# Patient Record
Sex: FEMALE | Race: WHITE | NOT HISPANIC OR LATINO | Employment: FULL TIME | URBAN - METROPOLITAN AREA
[De-identification: names, ages, dates, MRNs, and addresses within clinical notes are randomized per-mention and may not be internally consistent; named-entity substitution may affect disease eponyms.]

---

## 2017-12-13 ENCOUNTER — APPOINTMENT (OUTPATIENT)
Dept: WOMENS IMAGING | Facility: HOSPITAL | Age: 47
End: 2017-12-13

## 2017-12-13 PROCEDURE — 77063 BREAST TOMOSYNTHESIS BI: CPT | Performed by: RADIOLOGY

## 2017-12-13 PROCEDURE — G0202 SCR MAMMO BI INCL CAD: HCPCS | Performed by: RADIOLOGY

## 2017-12-13 PROCEDURE — 77067 SCR MAMMO BI INCL CAD: CPT | Performed by: RADIOLOGY

## 2017-12-13 PROCEDURE — MDREVIEWSP: Performed by: RADIOLOGY

## 2018-12-14 ENCOUNTER — APPOINTMENT (OUTPATIENT)
Dept: WOMENS IMAGING | Facility: HOSPITAL | Age: 48
End: 2018-12-14

## 2018-12-14 PROCEDURE — MDREVIEWSP: Performed by: RADIOLOGY

## 2018-12-14 PROCEDURE — 77063 BREAST TOMOSYNTHESIS BI: CPT | Performed by: RADIOLOGY

## 2018-12-14 PROCEDURE — 77067 SCR MAMMO BI INCL CAD: CPT | Performed by: RADIOLOGY

## 2019-12-16 ENCOUNTER — APPOINTMENT (OUTPATIENT)
Dept: WOMENS IMAGING | Facility: HOSPITAL | Age: 49
End: 2019-12-16

## 2019-12-16 PROCEDURE — MDREVIEWSP: Performed by: RADIOLOGY

## 2019-12-16 PROCEDURE — 77063 BREAST TOMOSYNTHESIS BI: CPT | Performed by: RADIOLOGY

## 2019-12-16 PROCEDURE — 77067 SCR MAMMO BI INCL CAD: CPT | Performed by: RADIOLOGY

## 2020-07-13 ENCOUNTER — PREP FOR SURGERY (OUTPATIENT)
Dept: OTHER | Facility: HOSPITAL | Age: 50
End: 2020-07-13

## 2020-07-13 DIAGNOSIS — Z12.11 SCREENING FOR COLON CANCER: Primary | ICD-10-CM

## 2020-09-02 ENCOUNTER — TRANSCRIBE ORDERS (OUTPATIENT)
Dept: SLEEP MEDICINE | Facility: HOSPITAL | Age: 50
End: 2020-09-02

## 2020-09-02 DIAGNOSIS — Z01.818 OTHER SPECIFIED PRE-OPERATIVE EXAMINATION: Primary | ICD-10-CM

## 2020-09-07 ENCOUNTER — LAB (OUTPATIENT)
Dept: LAB | Facility: HOSPITAL | Age: 50
End: 2020-09-07

## 2020-09-07 DIAGNOSIS — Z01.818 OTHER SPECIFIED PRE-OPERATIVE EXAMINATION: ICD-10-CM

## 2020-09-07 PROCEDURE — U0004 COV-19 TEST NON-CDC HGH THRU: HCPCS

## 2020-09-07 PROCEDURE — C9803 HOPD COVID-19 SPEC COLLECT: HCPCS

## 2020-09-08 LAB — SARS-COV-2 RNA RESP QL NAA+PROBE: NOT DETECTED

## 2020-09-09 ENCOUNTER — HOSPITAL ENCOUNTER (OUTPATIENT)
Facility: HOSPITAL | Age: 50
Setting detail: HOSPITAL OUTPATIENT SURGERY
Discharge: HOME | End: 2020-09-09
Attending: COLON & RECTAL SURGERY | Admitting: COLON & RECTAL SURGERY

## 2020-09-09 ENCOUNTER — ANESTHESIA EVENT (OUTPATIENT)
Dept: GASTROENTEROLOGY | Facility: HOSPITAL | Age: 50
End: 2020-09-09

## 2020-09-09 ENCOUNTER — ANESTHESIA (OUTPATIENT)
Dept: GASTROENTEROLOGY | Facility: HOSPITAL | Age: 50
End: 2020-09-09

## 2020-09-09 VITALS
OXYGEN SATURATION: 99 % | BODY MASS INDEX: 23.66 KG/M2 | HEART RATE: 68 BPM | SYSTOLIC BLOOD PRESSURE: 98 MMHG | RESPIRATION RATE: 16 BRPM | DIASTOLIC BLOOD PRESSURE: 52 MMHG | HEIGHT: 65 IN | WEIGHT: 142 LBS

## 2020-09-09 PROCEDURE — 45378 DIAGNOSTIC COLONOSCOPY: CPT | Performed by: COLON & RECTAL SURGERY

## 2020-09-09 PROCEDURE — 25010000002 PROPOFOL 10 MG/ML EMULSION: Performed by: NURSE ANESTHETIST, CERTIFIED REGISTERED

## 2020-09-09 RX ORDER — SODIUM CHLORIDE 0.9 % (FLUSH) 0.9 %
3 SYRINGE (ML) INJECTION EVERY 12 HOURS SCHEDULED
Status: DISCONTINUED | OUTPATIENT
Start: 2020-09-09 | End: 2020-09-09 | Stop reason: HOSPADM

## 2020-09-09 RX ORDER — LIDOCAINE HYDROCHLORIDE 20 MG/ML
INJECTION, SOLUTION INFILTRATION; PERINEURAL AS NEEDED
Status: DISCONTINUED | OUTPATIENT
Start: 2020-09-09 | End: 2020-09-09 | Stop reason: SURG

## 2020-09-09 RX ORDER — PROPOFOL 10 MG/ML
VIAL (ML) INTRAVENOUS CONTINUOUS PRN
Status: DISCONTINUED | OUTPATIENT
Start: 2020-09-09 | End: 2020-09-09 | Stop reason: SURG

## 2020-09-09 RX ORDER — GLYCOPYRROLATE 0.2 MG/ML
INJECTION INTRAMUSCULAR; INTRAVENOUS AS NEEDED
Status: DISCONTINUED | OUTPATIENT
Start: 2020-09-09 | End: 2020-09-09 | Stop reason: SURG

## 2020-09-09 RX ORDER — PROPOFOL 10 MG/ML
VIAL (ML) INTRAVENOUS AS NEEDED
Status: DISCONTINUED | OUTPATIENT
Start: 2020-09-09 | End: 2020-09-09 | Stop reason: SURG

## 2020-09-09 RX ORDER — SODIUM CHLORIDE, SODIUM LACTATE, POTASSIUM CHLORIDE, CALCIUM CHLORIDE 600; 310; 30; 20 MG/100ML; MG/100ML; MG/100ML; MG/100ML
30 INJECTION, SOLUTION INTRAVENOUS CONTINUOUS PRN
Status: DISCONTINUED | OUTPATIENT
Start: 2020-09-09 | End: 2020-09-09 | Stop reason: HOSPADM

## 2020-09-09 RX ORDER — SODIUM CHLORIDE 0.9 % (FLUSH) 0.9 %
10 SYRINGE (ML) INJECTION AS NEEDED
Status: DISCONTINUED | OUTPATIENT
Start: 2020-09-09 | End: 2020-09-09 | Stop reason: HOSPADM

## 2020-09-09 RX ADMIN — PROPOFOL 100 MG: 10 INJECTION, EMULSION INTRAVENOUS at 08:24

## 2020-09-09 RX ADMIN — PROPOFOL 50 MG: 10 INJECTION, EMULSION INTRAVENOUS at 08:26

## 2020-09-09 RX ADMIN — SODIUM CHLORIDE, POTASSIUM CHLORIDE, SODIUM LACTATE AND CALCIUM CHLORIDE 30 ML/HR: 600; 310; 30; 20 INJECTION, SOLUTION INTRAVENOUS at 06:55

## 2020-09-09 RX ADMIN — PROPOFOL 300 MCG/KG/MIN: 10 INJECTION, EMULSION INTRAVENOUS at 08:24

## 2020-09-09 RX ADMIN — GLYCOPYRROLATE 0.2 MCG: 0.2 INJECTION INTRAMUSCULAR; INTRAVENOUS at 08:31

## 2020-09-09 RX ADMIN — PROPOFOL 50 MG: 10 INJECTION, EMULSION INTRAVENOUS at 08:35

## 2020-09-09 RX ADMIN — LIDOCAINE HYDROCHLORIDE 60 MG: 20 INJECTION, SOLUTION INFILTRATION; PERINEURAL at 08:24

## 2020-09-09 NOTE — ANESTHESIA POSTPROCEDURE EVALUATION
"Patient: Svitlana Whitaker    Procedure Summary     Date:  09/09/20 Room / Location:  [facility] ENDOSCOPY 9 / [facility] ENDOSCOPY    Anesthesia Start:  0821 Anesthesia Stop:  0847    Procedure:  COLONOSCOPY to cecum (N/A ) Diagnosis:       Screening for colon cancer      (Screening for colon cancer [Z12.11])    Surgeon:  Su Page MD Provider:  Miles Bowen MD    Anesthesia Type:  MAC ASA Status:  1          Anesthesia Type: MAC    Vitals  Vitals Value Taken Time   BP 98/52 9/9/2020  9:05 AM   Temp     Pulse 68 9/9/2020  9:05 AM   Resp 16 9/9/2020  9:05 AM   SpO2 99 % 9/9/2020  9:05 AM           Post Anesthesia Care and Evaluation    Patient location during evaluation: bedside  Patient participation: complete - patient participated  Level of consciousness: awake and alert  Pain management: adequate  Airway patency: patent  Anesthetic complications: No anesthetic complications    Cardiovascular status: acceptable  Respiratory status: acceptable  Hydration status: acceptable    Comments: BP 98/52 (BP Location: Left arm, Patient Position: Lying)   Pulse 68   Resp 16   Ht 165.1 cm (65\")   Wt 64.4 kg (142 lb)   SpO2 99%   BMI 23.63 kg/m²       "

## 2020-09-09 NOTE — ANESTHESIA PREPROCEDURE EVALUATION
Anesthesia Evaluation     Patient summary reviewed and Nursing notes reviewed   NPO Solid Status: > 8 hours  NPO Liquid Status: > 4 hours           Airway   Mallampati: II  TM distance: >3 FB  Neck ROM: full  no difficulty expected  Dental - normal exam     Pulmonary - negative pulmonary ROS and normal exam   Cardiovascular - negative cardio ROS and normal exam        Neuro/Psych- negative ROS  GI/Hepatic/Renal/Endo - negative ROS     Musculoskeletal (-) negative ROS    Abdominal  - normal exam   Substance History - negative use     OB/GYN          Other - negative ROS                       Anesthesia Plan    ASA 1     MAC       Anesthetic plan, all risks, benefits, and alternatives have been provided, discussed and informed consent has been obtained with: patient.

## 2020-09-09 NOTE — H&P
Svitlana Whitaker is a 50 y.o. female  who is referred by Su Page MD for a colonoscopy. She   has an indications: screening for colon cancer.     She denies any change in bowel function, melena, or hematochezia.    Past Medical History:   Diagnosis Date   • Plantar fasciitis        Past Surgical History:   Procedure Laterality Date   •  SECTION     • TONSILLECTOMY         Medications Prior to Admission   Medication Sig Dispense Refill Last Dose   • adapalene (DIFFERIN) 0.1 % gel APPLY TO FACE AT BEDTIME  5 2020 at Unknown time   • aspirin 325 MG tablet Take 650 mg by mouth every night.   More than a month at Unknown time       No Known Allergies    Family History   Problem Relation Age of Onset   • Cancer Mother         breast   • Birth defects Maternal Grandmother         breast       Social History     Socioeconomic History   • Marital status: Unknown     Spouse name: Not on file   • Number of children: Not on file   • Years of education: Not on file   • Highest education level: Not on file   Tobacco Use   • Smoking status: Former Smoker   • Smokeless tobacco: Never Used   Substance and Sexual Activity   • Alcohol use: Yes     Comment: occ   • Drug use: No   • Sexual activity: Defer       Review of Systems   Gastrointestinal: Negative for abdominal pain, nausea and vomiting.   All other systems reviewed and are negative.      Vitals:    20 0649   BP: 134/74   Pulse: 62   Resp: 18   SpO2: 100%         Physical Exam   Constitutional: She is oriented to person, place, and time. She appears well-developed and well-nourished.   HENT:   Head: Normocephalic and atraumatic.   Eyes: Pupils are equal, round, and reactive to light. EOM are normal.   Cardiovascular: Regular rhythm.   Pulmonary/Chest: Effort normal.   Abdominal: Soft. She exhibits no distension.   Musculoskeletal: Normal range of motion.   Neurological: She is alert and oriented to person, place, and time.   Skin: Skin is warm and dry.    Psychiatric: Judgment and thought content normal.         Assessment/Plan      indications: screening for colon cancer         I recommend colonoscopy.  I described risks, benefits of the procedure with the patient including but not limited to bleeding, infection, possibility of perforation and possible polypectomy. All of the patient's questions were answered and they would like to proceed with the above recommendations.

## 2020-12-17 ENCOUNTER — APPOINTMENT (OUTPATIENT)
Dept: WOMENS IMAGING | Facility: HOSPITAL | Age: 50
End: 2020-12-17

## 2020-12-17 PROCEDURE — 77067 SCR MAMMO BI INCL CAD: CPT | Performed by: RADIOLOGY

## 2020-12-17 PROCEDURE — 77063 BREAST TOMOSYNTHESIS BI: CPT | Performed by: RADIOLOGY

## 2021-03-24 ENCOUNTER — BULK ORDERING (OUTPATIENT)
Dept: CASE MANAGEMENT | Facility: OTHER | Age: 51
End: 2021-03-24

## 2021-03-24 DIAGNOSIS — Z23 IMMUNIZATION DUE: ICD-10-CM

## 2021-12-20 ENCOUNTER — APPOINTMENT (OUTPATIENT)
Dept: WOMENS IMAGING | Facility: HOSPITAL | Age: 51
End: 2021-12-20

## 2021-12-20 PROCEDURE — 77063 BREAST TOMOSYNTHESIS BI: CPT | Performed by: RADIOLOGY

## 2021-12-20 PROCEDURE — 77067 SCR MAMMO BI INCL CAD: CPT | Performed by: RADIOLOGY

## 2022-12-21 ENCOUNTER — APPOINTMENT (OUTPATIENT)
Dept: WOMENS IMAGING | Facility: HOSPITAL | Age: 52
End: 2022-12-21
Payer: COMMERCIAL

## 2022-12-21 PROCEDURE — 77063 BREAST TOMOSYNTHESIS BI: CPT | Performed by: RADIOLOGY

## 2022-12-21 PROCEDURE — 77067 SCR MAMMO BI INCL CAD: CPT | Performed by: RADIOLOGY

## 2023-07-03 PROBLEM — R79.89 ELEVATED TSH: Status: ACTIVE | Noted: 2023-07-03

## 2023-07-03 PROBLEM — Z12.11 SCREENING FOR COLON CANCER: Status: RESOLVED | Noted: 2020-07-13 | Resolved: 2023-07-03

## 2023-07-03 PROBLEM — E61.1 LOW SERUM IRON: Status: ACTIVE | Noted: 2023-07-03

## 2023-09-08 ENCOUNTER — OFFICE VISIT (OUTPATIENT)
Dept: INTERNAL MEDICINE | Facility: CLINIC | Age: 53
End: 2023-09-08
Payer: COMMERCIAL

## 2023-09-08 VITALS
DIASTOLIC BLOOD PRESSURE: 60 MMHG | HEART RATE: 52 BPM | OXYGEN SATURATION: 97 % | HEIGHT: 65 IN | WEIGHT: 141 LBS | BODY MASS INDEX: 23.49 KG/M2 | SYSTOLIC BLOOD PRESSURE: 118 MMHG | RESPIRATION RATE: 16 BRPM

## 2023-09-08 DIAGNOSIS — E06.3 HYPOTHYROIDISM DUE TO HASHIMOTO'S THYROIDITIS: Primary | ICD-10-CM

## 2023-09-08 DIAGNOSIS — E03.8 HYPOTHYROIDISM DUE TO HASHIMOTO'S THYROIDITIS: Primary | ICD-10-CM

## 2023-09-08 DIAGNOSIS — R79.89 ELEVATED TSH: ICD-10-CM

## 2023-09-08 LAB
T4 FREE SERPL-MCNC: 1.11 NG/DL (ref 0.93–1.7)
TSH SERPL DL<=0.005 MIU/L-ACNC: 5.86 UIU/ML (ref 0.27–4.2)

## 2023-09-08 PROCEDURE — 99213 OFFICE O/P EST LOW 20 MIN: CPT | Performed by: NURSE PRACTITIONER

## 2023-09-08 NOTE — PROGRESS NOTES
Subjective   Svitlana Whitaker is a 53 y.o. female. Patient is here today for   Chief Complaint   Patient presents with    Elevated TSH          Vitals:    23 0928   BP: 118/60   Pulse: 52   Resp: 16   SpO2: 97%     Body mass index is 23.46 kg/m².  The following portions of the patient's history were reviewed and updated as appropriate: allergies, current medications, past family history, past medical history, past social history, past surgical history and problem list.    Past Medical History:   Diagnosis Date    Acne     Insomnia     Iron deficiency anemia     Plantar fasciitis       No Known Allergies   Social History     Socioeconomic History    Marital status: Unknown   Tobacco Use    Smoking status: Former     Packs/day: 0.00     Types: Cigarettes     Start date: 1990     Quit date: 2005     Years since quittin.6    Smokeless tobacco: Never    Tobacco comments:     Social smoker- not every day   Vaping Use    Vaping Use: Never used   Substance and Sexual Activity    Alcohol use: Not Currently     Comment: RARELY    Drug use: No    Sexual activity: Yes     Partners: Male     Birth control/protection: Vasectomy        Current Outpatient Medications:     levothyroxine (Synthroid) 25 MCG tablet, Take 1 tablet by mouth Every Morning. On an empty stomach, Disp: 30 tablet, Rfl: 3    valACYclovir (VALTREX) 1000 MG tablet, TAKE 2 TABLETS TWICE A DAY FOR 1 DAY AT FIRST SIGN OF ONSET, Disp: , Rfl:      Objective     History of Present Illness  Svitlana is a 53 year old female patient who is here for a 8 week follow up for Hypothyroidism. TSH was initially elevated at when doing a life extension lab screen which prompted her to schedule an appt. Tsh was rechecked in July and as 14. Thyroid antibodies were positive. She has minimal symptoms. She has mild fatigue at the tend of the day, some dysphoric mood which she attributes to menopause. She is cold some of the time. She has some hair loss as well  She is  tolerating levothyroxine without side effects.   She would like to see an endocrinologist for a second opinion on thyroid treatment     No visits with results within 1 Month(s) from this visit.   Latest known visit with results is:   Office Visit on 07/03/2023   Component Date Value Ref Range Status    TSH 07/03/2023 14.500 (H)  0.450 - 4.500 uIU/mL Final    Free T4 07/03/2023 0.82  0.82 - 1.77 ng/dL Final    Thyroid Peroxidase Antibody 07/03/2023 256 (H)  0 - 34 IU/mL Final    Thyroglobulin Ab 07/03/2023 8.0 (H)  0.0 - 0.9 IU/mL Final    Thyroglobulin Antibody measured by [employer]       Review of Systems   Constitutional:  Positive for fatigue. Negative for unexpected weight change.   Respiratory: Negative.     Cardiovascular: Negative.    Gastrointestinal:  Negative for constipation.   Endocrine: Positive for cold intolerance.   Skin:         Hair loss    Psychiatric/Behavioral:  Positive for dysphoric mood.      Physical Exam  Vitals and nursing note reviewed.   Constitutional:       General: She is not in acute distress.  HENT:      Head: Normocephalic.   Neck:      Thyroid: No thyromegaly.   Cardiovascular:      Rate and Rhythm: Normal rate and regular rhythm.   Pulmonary:      Effort: Pulmonary effort is normal.      Breath sounds: Normal breath sounds.   Musculoskeletal:      Cervical back: Neck supple.   Neurological:      Mental Status: She is alert.       Assessment    ASSESSMENT    Problems Addressed this Visit       Elevated TSH     Other Visit Diagnoses       Hypothyroidism due to Hashimoto's thyroiditis    -  Primary    Relevant Orders    T4, Free    TSH    US Thyroid    Ambulatory Referral to Endocrinology          Diagnoses         Codes Comments    Hypothyroidism due to Hashimoto's thyroiditis    -  Primary ICD-10-CM: E03.8, E06.3  ICD-9-CM: 244.8, 245.2     Elevated TSH     ICD-10-CM: R79.89  ICD-9-CM: 794.5             PLAN    Will get a US of the thyroid   Will refer to  endocrinology per patient request  Will chest tsh and free t4 today and adjust levothyroxine if needed   Return in about 6 months (around 3/8/2024) for Annual physical.

## 2023-09-11 ENCOUNTER — TELEPHONE (OUTPATIENT)
Dept: INTERNAL MEDICINE | Facility: CLINIC | Age: 53
End: 2023-09-11
Payer: COMMERCIAL

## 2023-09-11 RX ORDER — LEVOTHYROXINE SODIUM 0.05 MG/1
50 TABLET ORAL DAILY
Qty: 30 TABLET | Refills: 2 | Status: SHIPPED | OUTPATIENT
Start: 2023-09-11

## 2023-09-11 NOTE — TELEPHONE ENCOUNTER
----- Message from REGGIE Ram sent at 9/11/2023 10:29 AM EDT -----  Please call Svitlana and let her know that tsh is still slightly elevated, recommend increasing levothyroxine to 50mcg and rechecking tsh and free t4 in 8 weeks.   Let me know when you speak to her and I can send the rx in

## 2023-09-11 NOTE — PROGRESS NOTES
Please call Svitlana and let her know that tsh is still slightly elevated, recommend increasing levothyroxine to 50mcg and rechecking tsh and free t4 in 8 weeks.   Let me know when you speak to her and I can send the rx in

## 2023-09-26 ENCOUNTER — HOSPITAL ENCOUNTER (OUTPATIENT)
Facility: HOSPITAL | Age: 53
Discharge: HOME | End: 2023-09-26
Admitting: NURSE PRACTITIONER
Payer: COMMERCIAL

## 2023-09-26 DIAGNOSIS — E06.3 HYPOTHYROIDISM DUE TO HASHIMOTO'S THYROIDITIS: ICD-10-CM

## 2023-09-26 DIAGNOSIS — E03.8 HYPOTHYROIDISM DUE TO HASHIMOTO'S THYROIDITIS: ICD-10-CM

## 2023-09-26 PROCEDURE — 76536 US EXAM OF HEAD AND NECK: CPT

## 2023-09-28 ENCOUNTER — TELEPHONE (OUTPATIENT)
Dept: INTERNAL MEDICINE | Facility: CLINIC | Age: 53
End: 2023-09-28
Payer: COMMERCIAL

## 2023-09-28 DIAGNOSIS — R93.89 ABNORMAL IMAGING OF THYROID: Primary | ICD-10-CM

## 2023-09-28 NOTE — TELEPHONE ENCOUNTER
I reviewed the thyroid US and called the patient to discussed results   Will order a repeat thyroid US to be done in 3 months.   Discussed referral to ENT in the future . She would like to start looking for one now. I gave her the number for Abilio Stoll MD at [facility]       1.  A few hypoechoic lesions are present inferior to the right thyroid  gland measuring up to 0.8 cm in size. Differential considerations  include prominent lymph nodes versus parathyroid tissue. Correlation  with patient history is recommended and if there is clinical concern for  parathyroid adenoma, further evaluation with parathyroid scintigraphy is  recommended. Follow-up with thyroid ultrasound in 3 months is  recommended to ensure stability.  2.  Diffusely heterogenous thyroid echotexture in keeping with patient  history of diffuse thyroid disease.

## 2023-11-16 DIAGNOSIS — Z00.00 ROUTINE HEALTH MAINTENANCE: Primary | ICD-10-CM

## 2023-11-16 DIAGNOSIS — Z11.59 NEED FOR HEPATITIS C SCREENING TEST: ICD-10-CM

## 2023-11-21 LAB
ALBUMIN SERPL-MCNC: 4.7 G/DL (ref 3.5–5.2)
ALBUMIN/GLOB SERPL: 2.2 G/DL
ALP SERPL-CCNC: 39 U/L (ref 39–117)
ALT SERPL-CCNC: 15 U/L (ref 1–33)
AST SERPL-CCNC: 19 U/L (ref 1–32)
BASOPHILS # BLD AUTO: 0.03 10*3/MM3 (ref 0–0.2)
BASOPHILS NFR BLD AUTO: 0.6 % (ref 0–1.5)
BILIRUB SERPL-MCNC: 0.9 MG/DL (ref 0–1.2)
BUN SERPL-MCNC: 14 MG/DL (ref 6–20)
BUN/CREAT SERPL: 20 (ref 7–25)
CALCIUM SERPL-MCNC: 9.6 MG/DL (ref 8.6–10.5)
CHLORIDE SERPL-SCNC: 105 MMOL/L (ref 98–107)
CHOLEST SERPL-MCNC: 162 MG/DL (ref 0–200)
CO2 SERPL-SCNC: 27.5 MMOL/L (ref 22–29)
CREAT SERPL-MCNC: 0.7 MG/DL (ref 0.57–1)
EGFRCR SERPLBLD CKD-EPI 2021: 103.6 ML/MIN/1.73
EOSINOPHIL # BLD AUTO: 0.04 10*3/MM3 (ref 0–0.4)
EOSINOPHIL NFR BLD AUTO: 0.8 % (ref 0.3–6.2)
ERYTHROCYTE [DISTWIDTH] IN BLOOD BY AUTOMATED COUNT: 12.9 % (ref 12.3–15.4)
GLOBULIN SER CALC-MCNC: 2.1 GM/DL
GLUCOSE SERPL-MCNC: 94 MG/DL (ref 65–99)
HCT VFR BLD AUTO: 37.7 % (ref 34–46.6)
HCV IGG SERPL QL IA: NON REACTIVE
HDLC SERPL-MCNC: 60 MG/DL (ref 40–60)
HGB BLD-MCNC: 12.5 G/DL (ref 12–15.9)
IMM GRANULOCYTES # BLD AUTO: 0.02 10*3/MM3 (ref 0–0.05)
IMM GRANULOCYTES NFR BLD AUTO: 0.4 % (ref 0–0.5)
LDLC SERPL CALC-MCNC: 86 MG/DL (ref 0–100)
LDLC/HDLC SERPL: 1.4 {RATIO}
LYMPHOCYTES # BLD AUTO: 1.46 10*3/MM3 (ref 0.7–3.1)
LYMPHOCYTES NFR BLD AUTO: 28.8 % (ref 19.6–45.3)
MCH RBC QN AUTO: 28.2 PG (ref 26.6–33)
MCHC RBC AUTO-ENTMCNC: 33.2 G/DL (ref 31.5–35.7)
MCV RBC AUTO: 84.9 FL (ref 79–97)
MONOCYTES # BLD AUTO: 0.37 10*3/MM3 (ref 0.1–0.9)
MONOCYTES NFR BLD AUTO: 7.3 % (ref 5–12)
NEUTROPHILS # BLD AUTO: 3.15 10*3/MM3 (ref 1.7–7)
NEUTROPHILS NFR BLD AUTO: 62.1 % (ref 42.7–76)
NRBC BLD AUTO-RTO: 0 /100 WBC (ref 0–0.2)
PLATELET # BLD AUTO: 238 10*3/MM3 (ref 140–450)
POTASSIUM SERPL-SCNC: 4.7 MMOL/L (ref 3.5–5.2)
PROT SERPL-MCNC: 6.8 G/DL (ref 6–8.5)
RBC # BLD AUTO: 4.44 10*6/MM3 (ref 3.77–5.28)
SODIUM SERPL-SCNC: 141 MMOL/L (ref 136–145)
TRIGL SERPL-MCNC: 89 MG/DL (ref 0–150)
TSH SERPL DL<=0.005 MIU/L-ACNC: 3.07 UIU/ML (ref 0.27–4.2)
VLDLC SERPL CALC-MCNC: 16 MG/DL (ref 5–40)
WBC # BLD AUTO: 5.07 10*3/MM3 (ref 3.4–10.8)

## 2023-12-14 RX ORDER — LEVOTHYROXINE SODIUM 0.05 MG/1
50 TABLET ORAL DAILY
Qty: 90 TABLET | Refills: 1 | Status: SHIPPED | OUTPATIENT
Start: 2023-12-14

## 2023-12-22 ENCOUNTER — APPOINTMENT (OUTPATIENT)
Dept: WOMENS IMAGING | Facility: HOSPITAL | Age: 53
End: 2023-12-22
Payer: COMMERCIAL

## 2023-12-22 PROCEDURE — 77067 SCR MAMMO BI INCL CAD: CPT | Performed by: RADIOLOGY

## 2023-12-22 PROCEDURE — 77063 BREAST TOMOSYNTHESIS BI: CPT | Performed by: RADIOLOGY

## 2024-03-11 ENCOUNTER — OFFICE VISIT (OUTPATIENT)
Dept: INTERNAL MEDICINE | Facility: CLINIC | Age: 54
End: 2024-03-11
Payer: COMMERCIAL

## 2024-03-11 VITALS
WEIGHT: 143 LBS | HEART RATE: 59 BPM | SYSTOLIC BLOOD PRESSURE: 118 MMHG | OXYGEN SATURATION: 98 % | DIASTOLIC BLOOD PRESSURE: 70 MMHG | HEIGHT: 65 IN | BODY MASS INDEX: 23.82 KG/M2 | RESPIRATION RATE: 16 BRPM | TEMPERATURE: 97.5 F

## 2024-03-11 DIAGNOSIS — Z00.00 ANNUAL PHYSICAL EXAM: Primary | ICD-10-CM

## 2024-03-11 PROCEDURE — 99396 PREV VISIT EST AGE 40-64: CPT | Performed by: NURSE PRACTITIONER

## 2024-03-11 RX ORDER — LEVOTHYROXINE SODIUM 0.05 MG/1
50 TABLET ORAL DAILY
Qty: 90 TABLET | Refills: 3 | Status: SHIPPED | OUTPATIENT
Start: 2024-03-11

## 2024-03-11 RX ORDER — ESTRADIOL 10 UG/1
TABLET VAGINAL
COMMUNITY
Start: 2024-02-20 | End: 2024-03-11

## 2024-03-11 NOTE — PROGRESS NOTES
Subjective   Svitlana Whitaker is a 54 y.o. female. Patient is here today for   Chief Complaint   Patient presents with    Annual Exam          Vitals:    24 0756   BP: 118/70   Pulse: 59   Resp: 16   Temp: 97.5 °F (36.4 °C)   SpO2: 98%     Body mass index is 23.8 kg/m².    The following portions of the patient's history were reviewed and updated as appropriate: allergies, current medications, past family history, past medical history, past social history, past surgical history and problem list.    Past Medical History:   Diagnosis Date    Acne     Insomnia     Iron deficiency anemia     Plantar fasciitis       No Known Allergies   Social History     Socioeconomic History    Marital status: Unknown   Tobacco Use    Smoking status: Former     Current packs/day: 0.00     Types: Cigarettes     Quit date: 1990     Years since quittin.2    Smokeless tobacco: Never    Tobacco comments:     Social smoker- not every day   Vaping Use    Vaping status: Never Used   Substance and Sexual Activity    Alcohol use: Yes     Alcohol/week: 1.0 standard drink of alcohol     Types: 1 Cans of beer per week     Comment: RARELY    Drug use: No    Sexual activity: Yes     Partners: Male     Birth control/protection: Vasectomy        Current Outpatient Medications:     levothyroxine (SYNTHROID, LEVOTHROID) 50 MCG tablet, Take 1 tablet by mouth Daily., Disp: 90 tablet, Rfl: 3    valACYclovir (VALTREX) 1000 MG tablet, TAKE 2 TABLETS TWICE A DAY FOR 1 DAY AT FIRST SIGN OF ONSET, Disp: , Rfl:        Objective   History of Present Illness   Svitlana Whitaker 54 y.o. female who presents for an Annual physical exam.   Lab results discussed in detail with the patient.  Plan to update vaccines if needed today.  She is followed by Dr Stoll and had a recent updated Thyroid US to follow up from the one done at [facility] in September. I do not have those results to review.     Health Habits:  Dental Exam. up to date  Eye Exam. up to date  Exercise: 7  times/week.  Current exercise activities include: running/ jogging and walking    Preventative counseling  Discussed face to face the importance of healthy diet and exercise.     Lab Results (most recent)       No visits with results within 1 Month(s) from this visit.   Latest known visit with results is:   Orders Only on 11/16/2023   Component Date Value Ref Range Status    Glucose 11/20/2023 94  65 - 99 mg/dL Final    BUN 11/20/2023 14  6 - 20 mg/dL Final    Creatinine 11/20/2023 0.70  0.57 - 1.00 mg/dL Final    EGFR Result 11/20/2023 103.6  >60.0 mL/min/1.73 Final    Comment: GFR Normal >60  Chronic Kidney Disease <60  Kidney Failure <15      BUN/Creatinine Ratio 11/20/2023 20.0  7.0 - 25.0 Final    Sodium 11/20/2023 141  136 - 145 mmol/L Final    Potassium 11/20/2023 4.7  3.5 - 5.2 mmol/L Final    Chloride 11/20/2023 105  98 - 107 mmol/L Final    Total CO2 11/20/2023 27.5  22.0 - 29.0 mmol/L Final    Calcium 11/20/2023 9.6  8.6 - 10.5 mg/dL Final    Total Protein 11/20/2023 6.8  6.0 - 8.5 g/dL Final    Albumin 11/20/2023 4.7  3.5 - 5.2 g/dL Final    Globulin 11/20/2023 2.1  gm/dL Final    A/G Ratio 11/20/2023 2.2  g/dL Final    Total Bilirubin 11/20/2023 0.9  0.0 - 1.2 mg/dL Final    Alkaline Phosphatase 11/20/2023 39  39 - 117 U/L Final    AST (SGOT) 11/20/2023 19  1 - 32 U/L Final    ALT (SGPT) 11/20/2023 15  1 - 33 U/L Final    Total Cholesterol 11/20/2023 162  0 - 200 mg/dL Final    Comment: Cholesterol Reference Ranges  (U.S. Department of Health and Human Services ATP III  Classifications)  Desirable          <200 mg/dL  Borderline High    200-239 mg/dL  High Risk          >240 mg/dL  Triglyceride Reference Ranges  (U.S. Department of Health and Human Services ATP III  Classifications)  Normal           <150 mg/dL  Borderline High  150-199 mg/dL  High             200-499 mg/dL  Very High        >500 mg/dL  HDL Reference Ranges  (U.S. Department of Health and Human Services ATP III  Classifications)  Low      <40 mg/dl (major risk factor for CHD)  High    >60 mg/dl ('negative' risk factor for CHD)  LDL Reference Ranges  (U.S. Department of Health and Human Services ATP III  Classifications)  Optimal          <100 mg/dL  Near Optimal     100-129 mg/dL  Borderline High  130-159 mg/dL  High             160-189 mg/dL  Very High        >189 mg/dL      Triglycerides 11/20/2023 89  0 - 150 mg/dL Final    HDL Cholesterol 11/20/2023 60  40 - 60 mg/dL Final    VLDL Cholesterol Jay 11/20/2023 16  5 - 40 mg/dL Final    LDL Chol Calc ([facility]) 11/20/2023 86  0 - 100 mg/dL Final    LDL/HDL RATIO 11/20/2023 1.40   Final    TSH 11/20/2023 3.070  0.270 - 4.200 uIU/mL Final    WBC 11/20/2023 5.07  3.40 - 10.80 10*3/mm3 Final    RBC 11/20/2023 4.44  3.77 - 5.28 10*6/mm3 Final    Hemoglobin 11/20/2023 12.5  12.0 - 15.9 g/dL Final    Hematocrit 11/20/2023 37.7  34.0 - 46.6 % Final    MCV 11/20/2023 84.9  79.0 - 97.0 fL Final    MCH 11/20/2023 28.2  26.6 - 33.0 pg Final    MCHC 11/20/2023 33.2  31.5 - 35.7 g/dL Final    RDW 11/20/2023 12.9  12.3 - 15.4 % Final    Platelets 11/20/2023 238  140 - 450 10*3/mm3 Final    Neutrophil Rel % 11/20/2023 62.1  42.7 - 76.0 % Final    Lymphocyte Rel % 11/20/2023 28.8  19.6 - 45.3 % Final    Monocyte Rel % 11/20/2023 7.3  5.0 - 12.0 % Final    Eosinophil Rel % 11/20/2023 0.8  0.3 - 6.2 % Final    Basophil Rel % 11/20/2023 0.6  0.0 - 1.5 % Final    Neutrophils Absolute 11/20/2023 3.15  1.70 - 7.00 10*3/mm3 Final    Lymphocytes Absolute 11/20/2023 1.46  0.70 - 3.10 10*3/mm3 Final    Monocytes Absolute 11/20/2023 0.37  0.10 - 0.90 10*3/mm3 Final    Eosinophils Absolute 11/20/2023 0.04  0.00 - 0.40 10*3/mm3 Final    Basophils Absolute 11/20/2023 0.03  0.00 - 0.20 10*3/mm3 Final    Immature Granulocyte Rel % 11/20/2023 0.4  0.0 - 0.5 % Final    Immature Grans Absolute 11/20/2023 0.02  0.00 - 0.05 10*3/mm3 Final    nRBC 11/20/2023 0.0  0.0 - 0.2 /100 WBC Final    Hep C Virus Ab 11/20/2023 Non Reactive  Non Reactive  Final    Comment: HCV antibody alone does not differentiate between previously  resolved infection and active infection. Equivocal and Reactive  HCV antibody results should be followed up with an HCV RNA test  to support the diagnosis of active HCV infection.                     Review of Systems   Constitutional:  Negative for fatigue and fever.   HENT:  Positive for hearing loss (has had hearing test at [facility]).    Respiratory: Negative.     Cardiovascular: Negative.    Gastrointestinal: Negative.    Genitourinary: Negative.    Musculoskeletal: Negative.    Skin: Negative.    Allergic/Immunologic: Positive for environmental allergies.   Neurological: Negative.    Hematological: Negative.    Psychiatric/Behavioral:          Had some grief from recent losses,        Physical Exam  Vitals and nursing note reviewed.   Constitutional:       General: She is not in acute distress.     Appearance: Normal appearance. She is well-developed and well-groomed. She is not ill-appearing.   HENT:      Head: Normocephalic.      Right Ear: Tympanic membrane and ear canal normal.      Left Ear: Tympanic membrane and ear canal normal.      Nose: Nose normal.      Mouth/Throat:      Pharynx: Oropharynx is clear.   Eyes:      General: Lids are normal.      Conjunctiva/sclera: Conjunctivae normal.   Neck:      Thyroid: No thyromegaly.   Cardiovascular:      Rate and Rhythm: Normal rate and regular rhythm.      Heart sounds: Normal heart sounds.   Pulmonary:      Effort: Pulmonary effort is normal.      Breath sounds: Normal breath sounds.   Abdominal:      General: Bowel sounds are normal.      Palpations: Abdomen is soft.      Tenderness: There is no abdominal tenderness.   Musculoskeletal:      Cervical back: Neck supple.   Skin:     General: Skin is warm and dry.   Neurological:      Mental Status: She is alert and oriented to person, place, and time.   Psychiatric:         Mood and Affect: Mood normal.         ASSESSMENT        Problems Addressed this Visit    None  Visit Diagnoses       Annual physical exam    -  Primary          Diagnoses         Codes Comments    Annual physical exam    -  Primary ICD-10-CM: Z00.00  ICD-9-CM: V70.0             PLAN    Reviewed labs   She will continue current dose of levothyroxine and follow up with Dr Stoll as scheduled  [facility]   Age and sex appropriate physical exam performed and documented. Updated past medical, family, social and surgical histories as well as allergies and care team list. Addressed care gaps listed in the medical record.   -Encouraged annual dental and vision exams as part of their overall health.   -Encouraged  exercise  combined with a well-balanced diet.   -Immunizations reviewed and updated in EMR.       Return in about 1 year (around 3/11/2025) for Annual physical, with labs.  Patient was given instructions and counseling regarding her  condition for health maintenance advice.  Please see specific information pulled into the AVS if appropriate.

## 2024-05-16 RX ORDER — LEVOTHYROXINE SODIUM 0.05 MG/1
50 TABLET ORAL DAILY
Qty: 90 TABLET | Refills: 3 | Status: SHIPPED | OUTPATIENT
Start: 2024-05-16

## 2024-12-30 ENCOUNTER — APPOINTMENT (OUTPATIENT)
Dept: WOMENS IMAGING | Facility: HOSPITAL | Age: 54
End: 2024-12-30
Payer: COMMERCIAL

## 2024-12-30 PROCEDURE — 77067 SCR MAMMO BI INCL CAD: CPT | Performed by: RADIOLOGY

## 2024-12-30 PROCEDURE — 77063 BREAST TOMOSYNTHESIS BI: CPT | Performed by: RADIOLOGY

## 2025-01-23 ENCOUNTER — TELEPHONE (OUTPATIENT)
Dept: INTERNAL MEDICINE | Facility: CLINIC | Age: 55
End: 2025-01-23
Payer: COMMERCIAL

## 2025-01-23 RX ORDER — LEVOTHYROXINE SODIUM 50 UG/1
50 TABLET ORAL DAILY
Qty: 90 TABLET | Refills: 3 | Status: SHIPPED | OUTPATIENT
Start: 2025-01-23

## 2025-03-04 DIAGNOSIS — Z00.00 ROUTINE HEALTH MAINTENANCE: Primary | ICD-10-CM

## 2025-03-12 LAB
ALBUMIN SERPL-MCNC: 4.2 G/DL (ref 3.5–5.2)
ALBUMIN/GLOB SERPL: 1.8 G/DL
ALP SERPL-CCNC: 44 U/L (ref 39–117)
ALT SERPL-CCNC: 18 U/L (ref 1–33)
AST SERPL-CCNC: 22 U/L (ref 1–32)
BASOPHILS # BLD AUTO: 0.03 10*3/MM3 (ref 0–0.2)
BASOPHILS NFR BLD AUTO: 0.6 % (ref 0–1.5)
BILIRUB SERPL-MCNC: 0.6 MG/DL (ref 0–1.2)
BUN SERPL-MCNC: 13 MG/DL (ref 6–20)
BUN/CREAT SERPL: 16.3 (ref 7–25)
CALCIUM SERPL-MCNC: 9.2 MG/DL (ref 8.6–10.5)
CHLORIDE SERPL-SCNC: 105 MMOL/L (ref 98–107)
CHOLEST SERPL-MCNC: 176 MG/DL (ref 0–200)
CO2 SERPL-SCNC: 26.8 MMOL/L (ref 22–29)
CREAT SERPL-MCNC: 0.8 MG/DL (ref 0.57–1)
EGFRCR SERPLBLD CKD-EPI 2021: 87.1 ML/MIN/1.73
EOSINOPHIL # BLD AUTO: 0.1 10*3/MM3 (ref 0–0.4)
EOSINOPHIL NFR BLD AUTO: 2.1 % (ref 0.3–6.2)
ERYTHROCYTE [DISTWIDTH] IN BLOOD BY AUTOMATED COUNT: 12.7 % (ref 12.3–15.4)
GLOBULIN SER CALC-MCNC: 2.4 GM/DL
GLUCOSE SERPL-MCNC: 93 MG/DL (ref 65–99)
HCT VFR BLD AUTO: 39.6 % (ref 34–46.6)
HDLC SERPL-MCNC: 64 MG/DL (ref 40–60)
HGB BLD-MCNC: 12.6 G/DL (ref 12–15.9)
IMM GRANULOCYTES # BLD AUTO: 0.01 10*3/MM3 (ref 0–0.05)
IMM GRANULOCYTES NFR BLD AUTO: 0.2 % (ref 0–0.5)
LDLC SERPL CALC-MCNC: 101 MG/DL (ref 0–100)
LDLC/HDLC SERPL: 1.57 {RATIO}
LYMPHOCYTES # BLD AUTO: 1.37 10*3/MM3 (ref 0.7–3.1)
LYMPHOCYTES NFR BLD AUTO: 28.7 % (ref 19.6–45.3)
MCH RBC QN AUTO: 28.3 PG (ref 26.6–33)
MCHC RBC AUTO-ENTMCNC: 31.8 G/DL (ref 31.5–35.7)
MCV RBC AUTO: 89 FL (ref 79–97)
MONOCYTES # BLD AUTO: 0.39 10*3/MM3 (ref 0.1–0.9)
MONOCYTES NFR BLD AUTO: 8.2 % (ref 5–12)
NEUTROPHILS # BLD AUTO: 2.88 10*3/MM3 (ref 1.7–7)
NEUTROPHILS NFR BLD AUTO: 60.2 % (ref 42.7–76)
NRBC BLD AUTO-RTO: 0 /100 WBC (ref 0–0.2)
PLATELET # BLD AUTO: 207 10*3/MM3 (ref 140–450)
POTASSIUM SERPL-SCNC: 4.6 MMOL/L (ref 3.5–5.2)
PROT SERPL-MCNC: 6.6 G/DL (ref 6–8.5)
RBC # BLD AUTO: 4.45 10*6/MM3 (ref 3.77–5.28)
SODIUM SERPL-SCNC: 142 MMOL/L (ref 136–145)
T4 FREE SERPL-MCNC: 1.01 NG/DL (ref 0.93–1.7)
TRIGL SERPL-MCNC: 58 MG/DL (ref 0–150)
TSH SERPL DL<=0.005 MIU/L-ACNC: 11.6 UIU/ML (ref 0.27–4.2)
VLDLC SERPL CALC-MCNC: 11 MG/DL (ref 5–40)
WBC # BLD AUTO: 4.78 10*3/MM3 (ref 3.4–10.8)

## 2025-03-17 ENCOUNTER — OFFICE VISIT (OUTPATIENT)
Dept: INTERNAL MEDICINE | Facility: CLINIC | Age: 55
End: 2025-03-17
Payer: COMMERCIAL

## 2025-03-17 VITALS
TEMPERATURE: 96.9 F | OXYGEN SATURATION: 99 % | SYSTOLIC BLOOD PRESSURE: 104 MMHG | HEART RATE: 58 BPM | WEIGHT: 150 LBS | DIASTOLIC BLOOD PRESSURE: 60 MMHG | HEIGHT: 65 IN | RESPIRATION RATE: 16 BRPM | BODY MASS INDEX: 24.99 KG/M2

## 2025-03-17 DIAGNOSIS — E06.3 HYPOTHYROIDISM DUE TO HASHIMOTO'S THYROIDITIS: ICD-10-CM

## 2025-03-17 DIAGNOSIS — Z23 NEED FOR PNEUMOCOCCAL VACCINE: ICD-10-CM

## 2025-03-17 DIAGNOSIS — Z00.00 ANNUAL PHYSICAL EXAM: Primary | ICD-10-CM

## 2025-03-17 PROBLEM — R79.89 ELEVATED TSH: Status: RESOLVED | Noted: 2023-07-03 | Resolved: 2025-03-17

## 2025-03-17 PROCEDURE — 90471 IMMUNIZATION ADMIN: CPT | Performed by: NURSE PRACTITIONER

## 2025-03-17 PROCEDURE — 90677 PCV20 VACCINE IM: CPT | Performed by: NURSE PRACTITIONER

## 2025-03-17 PROCEDURE — 99214 OFFICE O/P EST MOD 30 MIN: CPT | Performed by: NURSE PRACTITIONER

## 2025-03-17 PROCEDURE — 99396 PREV VISIT EST AGE 40-64: CPT | Performed by: NURSE PRACTITIONER

## 2025-03-17 RX ORDER — ESTRADIOL 10 UG/1
TABLET, FILM COATED VAGINAL
COMMUNITY
Start: 2024-02-19

## 2025-03-17 RX ORDER — LEVOTHYROXINE SODIUM 75 UG/1
75 TABLET ORAL
Qty: 90 TABLET | Refills: 0 | Status: SHIPPED | OUTPATIENT
Start: 2025-03-17

## 2025-03-17 NOTE — PROGRESS NOTES
Subjective   Svitlana Whitaker is a 55 y.o. female. Patient is here today for   Chief Complaint   Patient presents with    Annual Exam          Vitals:    25 0750   BP: 104/60   Pulse: 58   Resp: 16   Temp: 96.9 °F (36.1 °C)   SpO2: 99%     Body mass index is 24.96 kg/m².    The following portions of the patient's history were reviewed and updated as appropriate: allergies, current medications, past family history, past medical history, past social history, past surgical history and problem list.    Past Medical History:   Diagnosis Date    Acne     Hypothyroidism     Insomnia     Iron deficiency anemia     Plantar fasciitis       No Known Allergies   Social History     Socioeconomic History    Marital status: Unknown   Tobacco Use    Smoking status: Former     Current packs/day: 0.00     Types: Cigarettes     Quit date: 1990     Years since quittin.2    Smokeless tobacco: Never    Tobacco comments:     Social smoker- not every day   Vaping Use    Vaping status: Never Used   Substance and Sexual Activity    Alcohol use: Yes     Alcohol/week: 1.0 standard drink of alcohol     Types: 1 Cans of beer per week     Comment: RARELY    Drug use: No    Sexual activity: Yes     Partners: Male     Birth control/protection: Vasectomy        Current Outpatient Medications:     estradiol (VAGIFEM) 10 MCG tablet vaginal tablet, , Disp: , Rfl:     valACYclovir (VALTREX) 1000 MG tablet, TAKE 2 TABLETS TWICE A DAY FOR 1 DAY AT FIRST SIGN OF ONSET, Disp: , Rfl:     levothyroxine (Synthroid) 75 MCG tablet, Take 1 tablet by mouth Every Morning., Disp: 90 tablet, Rfl: 0          History of Present Illness   Svitlana Whitaker 55 y.o. female who presents for an Annual physical exam and for a follow up for Hypothyroidism. She has been doing well with no interval events. She is compliant with her medication and is not experiencing any side effects.      Lab results discussed in detail with the patient.  Plan to update vaccines if needed  today.    Health Habits:  Dental Exam. up to date  Eye Exam. up to date  Exercise: 7 times/week.  Current exercise activities include: running/ jogging, walking, and weightlifting      Preventative counseling  - seat belt use for every car ride for patient and all occupants.   Encouraged sunscreen use to reduce risk of skin cancer for any days with sun exposure over 20 minutes.   -Encouraged annual dental and vision exams as part of their overall health.   -Encouraged  exercise  combined with a well-balanced diet.   -Immunizations reviewed and updated in EMR.     The 10-year ASCVD risk score (Tony RIGGINS, et al., 2019) is: 1%    Values used to calculate the score:      Age: 55 years      Sex: Female      Is Non- : No      Diabetic: No      Tobacco smoker: No      Systolic Blood Pressure: 104 mmHg      Is BP treated: No      HDL Cholesterol: 64 mg/dL      Total Cholesterol: 176 mg/dL    Lab Results (most recent)                   Health Maintenance   Topic Date Due    MAMMOGRAM  12/28/2025    PAP SMEAR  01/01/2026    ANNUAL PHYSICAL  03/17/2026    TDAP/TD VACCINES (2 - Td or Tdap) 07/21/2026    COLORECTAL CANCER SCREENING  09/09/2030    HEPATITIS C SCREENING  Completed    COVID-19 Vaccine  Completed    INFLUENZA VACCINE  Completed    Pneumococcal Vaccine 50+  Completed    ZOSTER VACCINE  Completed           Review of Systems   Constitutional:  Negative for fatigue.   Respiratory:  Negative for shortness of breath.    Cardiovascular: Negative.    Gastrointestinal:  Negative for abdominal pain and constipation.   Genitourinary: Negative.    Musculoskeletal: Negative.    Allergic/Immunologic: Positive for environmental allergies.   Neurological: Negative.    Hematological: Negative.    Psychiatric/Behavioral: Negative.         Objective   Physical Exam  Vitals and nursing note reviewed.   Constitutional:       General: She is not in acute distress.     Appearance: Normal appearance. She is  well-developed and well-groomed.   HENT:      Head: Normocephalic.      Right Ear: Tympanic membrane and ear canal normal.      Left Ear: Tympanic membrane and ear canal normal.      Nose: Nose normal.      Mouth/Throat:      Pharynx: Oropharynx is clear.   Eyes:      General: Lids are normal.      Conjunctiva/sclera: Conjunctivae normal.   Neck:      Thyroid: No thyromegaly.   Cardiovascular:      Rate and Rhythm: Normal rate and regular rhythm.      Heart sounds: Normal heart sounds.   Pulmonary:      Effort: Pulmonary effort is normal.      Breath sounds: Normal breath sounds.   Abdominal:      General: Bowel sounds are normal.      Palpations: Abdomen is soft.      Tenderness: There is no abdominal tenderness.   Musculoskeletal:      Cervical back: Neck supple.   Skin:     General: Skin is warm and dry.   Neurological:      Mental Status: She is alert and oriented to person, place, and time.   Psychiatric:         Mood and Affect: Mood normal.         ASSESSMENT       Problems Addressed this Visit       Hypothyroidism due to Hashimoto's thyroiditis    Relevant Medications    levothyroxine (Synthroid) 75 MCG tablet     Other Visit Diagnoses         Annual physical exam    -  Primary      Need for pneumococcal vaccine        Relevant Orders    Pneumococcal Conjugate Vaccine 20-Valent (PCV20) (Completed)          Diagnoses         Codes Comments      Annual physical exam    -  Primary ICD-10-CM: Z00.00  ICD-9-CM: V70.0       Hypothyroidism due to Hashimoto's thyroiditis     ICD-10-CM: E06.3  ICD-9-CM: 245.2       Need for pneumococcal vaccine     ICD-10-CM: Z23  ICD-9-CM: V03.82             PLAN    Age and sex appropriate physical exam performed and documented. Updated past medical, family, social and surgical histories as well as allergies and care team list.   Hypothyroidism- tsh is 11. Will increase levothyroxine to 75mcg and recheck tsh and free t4 in 8 weeks  3. Will get pap and mammogram [facility]   4. Prevnar 20 given today       Return in about 8 weeks (around 5/12/2025) for TSH FREE T4 .  Patient was given instructions and counseling regarding her  condition for health maintenance advice.  Please see specific information pulled into the AVS if appropriate.

## 2025-05-28 LAB
T4 FREE SERPL-MCNC: 1.17 NG/DL (ref 0.92–1.68)
TSH SERPL DL<=0.005 MIU/L-ACNC: 3.46 UIU/ML (ref 0.27–4.2)

## 2025-06-02 RX ORDER — LEVOTHYROXINE SODIUM 75 MCG
75 TABLET ORAL EVERY MORNING
Qty: 90 TABLET | Refills: 0 | Status: SHIPPED | OUTPATIENT
Start: 2025-06-02

## (undated) DEVICE — TUBING, SUCTION, 1/4" X 10', STRAIGHT: Brand: MEDLINE

## (undated) DEVICE — CANN O2 ETCO2 FITS ALL CONN CO2 SMPL A/ 7IN DISP LF

## (undated) DEVICE — KT ORCA ORCAPOD DISP STRL

## (undated) DEVICE — SENSR O2 OXIMAX FNGR A/ 18IN NONSTR

## (undated) DEVICE — ADAPT CLN BIOGUARD AIR/H2O DISP

## (undated) DEVICE — GOWN SURG AERO CHROME XL

## (undated) DEVICE — LN SMPL CO2 SHTRM SD STREAM W/M LUER

## (undated) DEVICE — THE TORRENT IRRIGATION SCOPE CONNECTOR IS USED WITH THE TORRENT IRRIGATION TUBING TO PROVIDE IRRIGATION FLUIDS SUCH AS STERILE WATER DURING GASTROINTESTINAL ENDOSCOPIC PROCEDURES WHEN USED IN CONJUNCTION WITH AN IRRIGATION PUMP (OR ELECTROSURGICAL UNIT).: Brand: TORRENT